# Patient Record
Sex: FEMALE | Race: WHITE
[De-identification: names, ages, dates, MRNs, and addresses within clinical notes are randomized per-mention and may not be internally consistent; named-entity substitution may affect disease eponyms.]

---

## 2017-05-19 ENCOUNTER — RX ONLY (RX ONLY)
Age: 72
End: 2017-05-19

## 2017-05-19 ENCOUNTER — DOCTOR'S OFFICE (OUTPATIENT)
Dept: URBAN - METROPOLITAN AREA CLINIC 136 | Facility: CLINIC | Age: 72
Setting detail: OPHTHALMOLOGY
End: 2017-05-19
Payer: COMMERCIAL

## 2017-05-19 DIAGNOSIS — H35.033: ICD-10-CM

## 2017-05-19 DIAGNOSIS — H04.123: ICD-10-CM

## 2017-05-19 DIAGNOSIS — H35.3121: ICD-10-CM

## 2017-05-19 DIAGNOSIS — H52.223: ICD-10-CM

## 2017-05-19 DIAGNOSIS — H25.13: ICD-10-CM

## 2017-05-19 DIAGNOSIS — H52.03: ICD-10-CM

## 2017-05-19 DIAGNOSIS — H52.4: ICD-10-CM

## 2017-05-19 PROCEDURE — 92015 DETERMINE REFRACTIVE STATE: CPT | Performed by: OPTOMETRIST

## 2017-05-19 PROCEDURE — 92014 COMPRE OPH EXAM EST PT 1/>: CPT | Performed by: OPTOMETRIST

## 2017-05-19 ASSESSMENT — REFRACTION_AUTOREFRACTION
OD_CYLINDER: -2.25
OD_SPHERE: +3.50
OS_CYLINDER: -1.00
OS_SPHERE: +2.25
OS_AXIS: 112
OD_AXIS: 102

## 2017-05-19 ASSESSMENT — SUPERFICIAL PUNCTATE KERATITIS (SPK)
OS_SPK: ABSENT
OD_SPK: 1+

## 2017-05-19 ASSESSMENT — REFRACTION_CURRENTRX
OS_OVR_VA: 20/
OS_OVR_VA: 20/
OD_AXIS: 105
OD_OVR_VA: 20/
OD_OVR_VA: 20/
OS_ADD: +2.75
OD_OVR_VA: 20/
OD_CYLINDER: -0.50
OS_AXIS: 107
OD_ADD: +2.75
OS_CYLINDER: -0.75
OD_VPRISM_DIRECTION: PROGS
OS_OVR_VA: 20/
OS_VPRISM_DIRECTION: PROGS
OD_SPHERE: +1.25
OS_SPHERE: +2.50

## 2017-05-19 ASSESSMENT — REFRACTION_MANIFEST
OS_VA1: 20/
OD_VA1: 20/
OD_VA3: 20/
OU_VA: 20/
OS_VA3: 20/
OU_VA: 20/
OD_VA2: 20/
OD_VA1: 20/
OD_VA2: 20/
OS_VA1: 20/
OS_VA2: 20/
OD_VA3: 20/
OS_VA2: 20/
OS_VA3: 20/

## 2017-05-19 ASSESSMENT — REFRACTION_OUTSIDERX
OD_SPHERE: +3.00
OS_VA3: 20/
OD_AXIS: 100
OS_CYLINDER: -1.00
OS_AXIS: 105
OD_ADD: +2.75
OS_VA2: 20/20
OU_VA: 20/20
OD_CYLINDER: -1.00
OS_ADD: +2.75
OS_VA1: 20/20
OD_VA1: 20/20
OS_SPHERE: +3.25
OD_VA3: 20/
OD_VA2: 20/20

## 2017-05-19 ASSESSMENT — KERATOMETRY
OS_K2POWER_DIOPTERS: 46.25
OS_K1POWER_DIOPTERS: 46.00
OD_K1POWER_DIOPTERS: 45.25
OD_AXISANGLE_DEGREES: 110
OD_K2POWER_DIOPTERS: 46.00
OS_AXISANGLE_DEGREES: 152

## 2017-05-19 ASSESSMENT — LID EXAM ASSESSMENTS
OS_MEIBOMITIS: +2
OD_MEIBOMITIS: +2

## 2017-05-19 ASSESSMENT — VISUAL ACUITY
OD_BCVA: 20/25-2
OS_BCVA: 20/40+2

## 2017-05-19 ASSESSMENT — TEAR BREAK UP TIME (TBUT)
OS_TBUT: 2+
OD_TBUT: 2+

## 2017-05-19 ASSESSMENT — SPHEQUIV_DERIVED
OS_SPHEQUIV: 1.75
OD_SPHEQUIV: 2.375

## 2017-05-19 ASSESSMENT — CONFRONTATIONAL VISUAL FIELD TEST (CVF)
OS_FINDINGS: FULL
OD_FINDINGS: FULL

## 2017-05-19 ASSESSMENT — DECREASING TEAR LAKE - SEVERITY SCORE
OD_DEC_TEARLAKE: 2+
OS_DEC_TEARLAKE: 2+

## 2017-05-19 ASSESSMENT — AXIALLENGTH_DERIVED
OS_AL: 22.0533
OD_AL: 22.0011

## 2017-05-23 ENCOUNTER — OPTICAL OFFICE (OUTPATIENT)
Dept: URBAN - METROPOLITAN AREA CLINIC 143 | Facility: CLINIC | Age: 72
Setting detail: OPHTHALMOLOGY
End: 2017-05-23

## 2017-05-23 DIAGNOSIS — H52.223: ICD-10-CM

## 2017-05-23 PROCEDURE — V2020 VISION SVCS FRAMES PURCHASES: HCPCS | Performed by: OPTOMETRIST

## 2017-05-23 PROCEDURE — V2750 ANTI-REFLECTIVE COATING: HCPCS | Performed by: OPTOMETRIST

## 2017-05-23 PROCEDURE — V2781 PROGRESSIVE LENS PER LENS: HCPCS | Performed by: OPTOMETRIST

## 2017-12-07 ENCOUNTER — DOCTOR'S OFFICE (OUTPATIENT)
Dept: URBAN - METROPOLITAN AREA CLINIC 136 | Facility: CLINIC | Age: 72
Setting detail: OPHTHALMOLOGY
End: 2017-12-07
Payer: COMMERCIAL

## 2017-12-07 DIAGNOSIS — H35.3121: ICD-10-CM

## 2017-12-07 DIAGNOSIS — H25.13: ICD-10-CM

## 2017-12-07 DIAGNOSIS — H35.3131: ICD-10-CM

## 2017-12-07 DIAGNOSIS — H35.3111: ICD-10-CM

## 2017-12-07 DIAGNOSIS — H35.033: ICD-10-CM

## 2017-12-07 DIAGNOSIS — H04.123: ICD-10-CM

## 2017-12-07 PROCEDURE — 92014 COMPRE OPH EXAM EST PT 1/>: CPT | Performed by: OPHTHALMOLOGY

## 2017-12-07 PROCEDURE — 92134 CPTRZ OPH DX IMG PST SGM RTA: CPT | Performed by: OPHTHALMOLOGY

## 2017-12-07 ASSESSMENT — DECREASING TEAR LAKE - SEVERITY SCORE: OD_DEC_TEARLAKE: 2+

## 2017-12-07 ASSESSMENT — LID EXAM ASSESSMENTS
OD_MEIBOMITIS: +2
OS_MEIBOMITIS: +2

## 2017-12-07 ASSESSMENT — SUPERFICIAL PUNCTATE KERATITIS (SPK)
OD_SPK: 1+
OS_SPK: 1+

## 2017-12-07 ASSESSMENT — CONFRONTATIONAL VISUAL FIELD TEST (CVF)
OS_FINDINGS: FULL
OD_FINDINGS: FULL

## 2017-12-11 ASSESSMENT — VISUAL ACUITY
OS_BCVA: 20/25
OD_BCVA: 20/25+2

## 2017-12-11 ASSESSMENT — SPHEQUIV_DERIVED
OS_SPHEQUIV: 1.75
OD_SPHEQUIV: 2.375

## 2017-12-11 ASSESSMENT — REFRACTION_MANIFEST
OS_VA1: 20/
OD_VA3: 20/
OS_VA3: 20/
OD_VA2: 20/
OD_VA1: 20/
OD_VA3: 20/
OS_VA2: 20/
OU_VA: 20/
OS_VA3: 20/
OD_VA1: 20/
OU_VA: 20/
OS_VA2: 20/
OD_VA2: 20/
OS_VA1: 20/

## 2017-12-11 ASSESSMENT — REFRACTION_AUTOREFRACTION
OD_SPHERE: +3.50
OD_CYLINDER: -2.25
OS_SPHERE: +2.25
OS_AXIS: 112
OS_CYLINDER: -1.00
OD_AXIS: 102

## 2017-12-11 ASSESSMENT — REFRACTION_OUTSIDERX
OU_VA: 20/20
OS_VA1: 20/20
OD_VA3: 20/
OD_VA1: 20/20
OS_AXIS: 105
OD_CYLINDER: -1.00
OD_ADD: +2.75
OS_VA2: 20/20
OD_AXIS: 100
OD_VA2: 20/20
OS_SPHERE: +3.25
OS_ADD: +2.75
OS_VA3: 20/
OS_CYLINDER: -1.00
OD_SPHERE: +3.00

## 2017-12-11 ASSESSMENT — REFRACTION_CURRENTRX
OS_ADD: +2.75
OD_SPHERE: +1.25
OD_OVR_VA: 20/
OS_VPRISM_DIRECTION: PROGS
OS_OVR_VA: 20/
OD_ADD: +2.75
OS_OVR_VA: 20/
OD_AXIS: 105
OS_OVR_VA: 20/
OD_VPRISM_DIRECTION: PROGS
OD_CYLINDER: -0.50
OS_AXIS: 107
OS_SPHERE: +2.50
OS_CYLINDER: -0.75

## 2018-05-16 ENCOUNTER — DOCTOR'S OFFICE (OUTPATIENT)
Dept: URBAN - METROPOLITAN AREA CLINIC 136 | Facility: CLINIC | Age: 73
Setting detail: OPHTHALMOLOGY
End: 2018-05-16
Payer: COMMERCIAL

## 2018-05-16 DIAGNOSIS — H35.033: ICD-10-CM

## 2018-05-16 DIAGNOSIS — H35.3131: ICD-10-CM

## 2018-05-16 DIAGNOSIS — H25.13: ICD-10-CM

## 2018-05-16 DIAGNOSIS — H04.123: ICD-10-CM

## 2018-05-16 PROCEDURE — 92134 CPTRZ OPH DX IMG PST SGM RTA: CPT | Performed by: OPTOMETRIST

## 2018-05-16 PROCEDURE — 92014 COMPRE OPH EXAM EST PT 1/>: CPT | Performed by: OPTOMETRIST

## 2018-05-16 ASSESSMENT — REFRACTION_OUTSIDERX
OD_ADD: +2.75
OD_SPHERE: +3.00
OS_VA2: 20/20
OS_VA3: 20/
OD_VA1: 20/20
OS_SPHERE: +3.25
OS_AXIS: 105
OD_VA3: 20/
OS_ADD: +2.75
OS_CYLINDER: -1.00
OD_AXIS: 100
OS_VA1: 20/20
OU_VA: 20/20
OD_CYLINDER: -1.00
OD_VA2: 20/20

## 2018-05-16 ASSESSMENT — DECREASING TEAR LAKE - SEVERITY SCORE
OS_DEC_TEARLAKE: 1+
OD_DEC_TEARLAKE: 1+

## 2018-05-16 ASSESSMENT — REFRACTION_MANIFEST
OD_VA1: 20/
OS_VA1: 20/
OU_VA: 20/
OS_VA3: 20/
OS_VA2: 20/
OS_VA1: 20/
OS_VA2: 20/
OD_VA1: 20/
OD_VA3: 20/
OD_VA2: 20/
OD_VA3: 20/
OD_VA2: 20/
OS_VA3: 20/
OU_VA: 20/

## 2018-05-16 ASSESSMENT — KERATOMETRY
OS_K1POWER_DIOPTERS: 46.00
OD_K1POWER_DIOPTERS: 45.25
OD_AXISANGLE_DEGREES: 110
OD_K2POWER_DIOPTERS: 46.00
OS_K2POWER_DIOPTERS: 46.25
OS_AXISANGLE_DEGREES: 152

## 2018-05-16 ASSESSMENT — CONFRONTATIONAL VISUAL FIELD TEST (CVF)
OD_FINDINGS: FULL
OS_FINDINGS: FULL

## 2018-05-16 ASSESSMENT — REFRACTION_CURRENTRX
OD_CYLINDER: -1.00
OD_SPHERE: +1.25
OD_SPHERE: +3.50
OS_OVR_VA: 20/
OS_AXIS: 107
OS_VPRISM_DIRECTION: PROGS
OD_VPRISM_DIRECTION: PROGS
OS_CYLINDER: -0.75
OD_ADD: +2.75
OS_OVR_VA: 20/
OD_AXIS: 104
OD_OVR_VA: 20/
OD_OVR_VA: 20/
OS_SPHERE: +2.50
OD_ADD: +2.75
OD_CYLINDER: -0.50
OD_VPRISM_DIRECTION: PROGS
OS_CYLINDER: -1.00
OS_ADD: +2.75
OD_AXIS: 105
OS_SPHERE: +3.00
OS_ADD: +2.75
OS_OVR_VA: 20/
OS_AXIS: 099
OS_VPRISM_DIRECTION: PROGS
OD_OVR_VA: 20/

## 2018-05-16 ASSESSMENT — REFRACTION_AUTOREFRACTION
OD_SPHERE: +3.25
OS_AXIS: 107
OD_AXIS: 102
OD_CYLINDER: -1.75
OS_SPHERE: +3.00
OS_CYLINDER: -0.75

## 2018-05-16 ASSESSMENT — LID EXAM ASSESSMENTS
OD_MEIBOMITIS: +2
OS_MEIBOMITIS: +2

## 2018-05-16 ASSESSMENT — SUPERFICIAL PUNCTATE KERATITIS (SPK)
OD_SPK: ABSENT
OS_SPK: ABSENT

## 2018-05-16 ASSESSMENT — SPHEQUIV_DERIVED
OS_SPHEQUIV: 2.625
OD_SPHEQUIV: 2.375

## 2018-05-16 ASSESSMENT — VISUAL ACUITY
OS_BCVA: 20/20
OD_BCVA: 20/25-1

## 2018-05-16 ASSESSMENT — AXIALLENGTH_DERIVED
OS_AL: 21.7593
OD_AL: 22.0011

## 2018-11-19 ENCOUNTER — DOCTOR'S OFFICE (OUTPATIENT)
Dept: URBAN - METROPOLITAN AREA CLINIC 136 | Facility: CLINIC | Age: 73
Setting detail: OPHTHALMOLOGY
End: 2018-11-19
Payer: COMMERCIAL

## 2018-11-19 DIAGNOSIS — H35.033: ICD-10-CM

## 2018-11-19 DIAGNOSIS — H35.3131: ICD-10-CM

## 2018-11-19 DIAGNOSIS — H04.123: ICD-10-CM

## 2018-11-19 DIAGNOSIS — H25.13: ICD-10-CM

## 2018-11-19 PROCEDURE — 92134 CPTRZ OPH DX IMG PST SGM RTA: CPT | Performed by: OPTOMETRIST

## 2018-11-19 PROCEDURE — 92014 COMPRE OPH EXAM EST PT 1/>: CPT | Performed by: OPTOMETRIST

## 2018-11-19 ASSESSMENT — REFRACTION_CURRENTRX
OS_ADD: +2.75
OD_CYLINDER: -1.00
OD_OVR_VA: 20/
OD_VPRISM_DIRECTION: PROGS
OS_OVR_VA: 20/
OS_AXIS: 099
OS_SPHERE: +3.00
OD_VPRISM_DIRECTION: PROGS
OD_SPHERE: +3.50
OD_ADD: +2.75
OS_SPHERE: +3.25
OS_OVR_VA: 20/
OS_CYLINDER: -1.00
OS_VPRISM_DIRECTION: PROGS
OD_ADD: +2.75
OS_ADD: +2.75
OS_AXIS: 103
OS_CYLINDER: -1.00
OD_CYLINDER: -1.00
OD_AXIS: 104
OD_SPHERE: +3.00
OD_AXIS: 101
OS_OVR_VA: 20/
OD_OVR_VA: 20/
OD_OVR_VA: 20/
OS_VPRISM_DIRECTION: PROGS

## 2018-11-19 ASSESSMENT — REFRACTION_AUTOREFRACTION
OS_SPHERE: +3.00
OS_CYLINDER: -0.50
OD_AXIS: 108
OD_CYLINDER: -1.00
OD_SPHERE: +3.50
OS_AXIS: 116

## 2018-11-19 ASSESSMENT — REFRACTION_MANIFEST
OU_VA: 20/20
OS_VA2: 20/
OS_VA1: 20/
OS_VA1: 20/20
OD_VA3: 20/
OS_VA2: 20/20
OD_SPHERE: +3.00
OD_ADD: +2.75
OS_VA3: 20/
OD_VA2: 20/
OS_AXIS: 105
OD_VA3: 20/
OS_VA3: 20/
OD_VA1: 20/
OS_SPHERE: +3.25
OD_VA2: 20/20
OS_ADD: +2.75
OD_CYLINDER: -1.00
OD_AXIS: 100
OD_VA1: 20/20
OS_CYLINDER: -1.00
OU_VA: 20/

## 2018-11-19 ASSESSMENT — VISUAL ACUITY
OD_BCVA: 20/25+2
OS_BCVA: 20/25-2

## 2018-11-19 ASSESSMENT — SPHEQUIV_DERIVED
OS_SPHEQUIV: 2.75
OD_SPHEQUIV: 2.5
OS_SPHEQUIV: 2.75
OD_SPHEQUIV: 3

## 2018-11-19 ASSESSMENT — DECREASING TEAR LAKE - SEVERITY SCORE
OS_DEC_TEARLAKE: 1+
OD_DEC_TEARLAKE: 1+

## 2018-11-19 ASSESSMENT — KERATOMETRY
OD_AXISANGLE_DEGREES: 110
OS_AXISANGLE_DEGREES: 152
OD_K2POWER_DIOPTERS: 46.00
OS_K2POWER_DIOPTERS: 46.25
OS_K1POWER_DIOPTERS: 46.00
OD_K1POWER_DIOPTERS: 45.25

## 2018-11-19 ASSESSMENT — AXIALLENGTH_DERIVED
OD_AL: 21.7913
OS_AL: 21.718
OD_AL: 21.9588
OS_AL: 21.718

## 2018-11-19 ASSESSMENT — SUPERFICIAL PUNCTATE KERATITIS (SPK)
OD_SPK: ABSENT
OS_SPK: ABSENT

## 2018-11-19 ASSESSMENT — CONFRONTATIONAL VISUAL FIELD TEST (CVF)
OS_FINDINGS: FULL
OD_FINDINGS: FULL

## 2018-11-19 ASSESSMENT — LID EXAM ASSESSMENTS
OS_MEIBOMITIS: +2
OD_MEIBOMITIS: +2

## 2019-05-22 ENCOUNTER — DOCTOR'S OFFICE (OUTPATIENT)
Dept: URBAN - METROPOLITAN AREA CLINIC 136 | Facility: CLINIC | Age: 74
Setting detail: OPHTHALMOLOGY
End: 2019-05-22
Payer: COMMERCIAL

## 2019-05-22 DIAGNOSIS — H52.223: ICD-10-CM

## 2019-05-22 DIAGNOSIS — H25.13: ICD-10-CM

## 2019-05-22 DIAGNOSIS — H52.03: ICD-10-CM

## 2019-05-22 DIAGNOSIS — H52.4: ICD-10-CM

## 2019-05-22 DIAGNOSIS — H04.123: ICD-10-CM

## 2019-05-22 DIAGNOSIS — H35.3131: ICD-10-CM

## 2019-05-22 DIAGNOSIS — H35.033: ICD-10-CM

## 2019-05-22 PROCEDURE — 92015 DETERMINE REFRACTIVE STATE: CPT | Performed by: OPTOMETRIST

## 2019-05-22 PROCEDURE — 92134 CPTRZ OPH DX IMG PST SGM RTA: CPT | Performed by: OPTOMETRIST

## 2019-05-22 PROCEDURE — 92014 COMPRE OPH EXAM EST PT 1/>: CPT | Performed by: OPTOMETRIST

## 2019-05-22 ASSESSMENT — REFRACTION_AUTOREFRACTION
OD_CYLINDER: -1.25
OS_CYLINDER: -0.25
OS_AXIS: 159
OD_AXIS: 106
OS_SPHERE: +2.50
OD_SPHERE: +3.75

## 2019-05-22 ASSESSMENT — REFRACTION_CURRENTRX
OS_OVR_VA: 20/
OS_ADD: +2.75
OS_VPRISM_DIRECTION: PROGS
OD_SPHERE: +3.50
OS_CYLINDER: -1.00
OS_AXIS: 099
OD_CYLINDER: -0.75
OD_SPHERE: +3.00
OD_OVR_VA: 20/
OS_SPHERE: +3.25
OS_OVR_VA: 20/
OS_ADD: +2.75
OS_VPRISM_DIRECTION: PROGS
OD_OVR_VA: 20/
OD_AXIS: 096
OS_SPHERE: +3.00
OS_CYLINDER: -1.00
OD_VPRISM_DIRECTION: PROGS
OD_VPRISM_DIRECTION: PROGS
OD_OVR_VA: 20/
OS_AXIS: 100
OD_ADD: +2.75
OS_OVR_VA: 20/
OD_ADD: +2.75
OD_AXIS: 104
OD_CYLINDER: -1.00

## 2019-05-22 ASSESSMENT — LID EXAM ASSESSMENTS
OS_MEIBOMITIS: +2
OD_MEIBOMITIS: +2

## 2019-05-22 ASSESSMENT — SPHEQUIV_DERIVED
OD_SPHEQUIV: 2.625
OS_SPHEQUIV: 2.75
OS_SPHEQUIV: 2.375
OD_SPHEQUIV: 3.125

## 2019-05-22 ASSESSMENT — KERATOMETRY
OS_K1POWER_DIOPTERS: 46.00
OS_K2POWER_DIOPTERS: 46.25
OS_AXISANGLE_DEGREES: 152
OD_K2POWER_DIOPTERS: 46.00
OD_K1POWER_DIOPTERS: 45.25
OD_AXISANGLE_DEGREES: 110

## 2019-05-22 ASSESSMENT — REFRACTION_MANIFEST
OS_ADD: +2.75
OS_VA2: 20/
OD_VA2: 20/20
OS_AXIS: 100
OD_VA3: 20/
OD_CYLINDER: -1.25
OD_SPHERE: +3.25
OS_SPHERE: +3.25
OS_VA1: 20/
OD_VA1: 20/
OD_VA3: 20/
OS_VA2: 20/20
OS_CYLINDER: -1.00
OD_VA1: 20/20
OD_VA2: 20/
OD_ADD: +2.75
OS_VA3: 20/
OS_VA1: 20/20
OS_VA3: 20/
OD_AXIS: 105
OU_VA: 20/
OU_VA: 20/20

## 2019-05-22 ASSESSMENT — VISUAL ACUITY
OD_BCVA: 20/20
OS_BCVA: 20/20

## 2019-05-22 ASSESSMENT — SUPERFICIAL PUNCTATE KERATITIS (SPK)
OS_SPK: ABSENT
OD_SPK: ABSENT

## 2019-05-22 ASSESSMENT — DECREASING TEAR LAKE - SEVERITY SCORE
OD_DEC_TEARLAKE: 1+
OS_DEC_TEARLAKE: 1+

## 2019-05-22 ASSESSMENT — AXIALLENGTH_DERIVED
OS_AL: 21.718
OD_AL: 21.7499
OD_AL: 21.9167
OS_AL: 21.8425

## 2019-05-22 ASSESSMENT — CONFRONTATIONAL VISUAL FIELD TEST (CVF)
OD_FINDINGS: FULL
OS_FINDINGS: FULL

## 2019-11-20 ENCOUNTER — DOCTOR'S OFFICE (OUTPATIENT)
Dept: URBAN - METROPOLITAN AREA CLINIC 136 | Facility: CLINIC | Age: 74
Setting detail: OPHTHALMOLOGY
End: 2019-11-20
Payer: COMMERCIAL

## 2019-11-20 DIAGNOSIS — H35.033: ICD-10-CM

## 2019-11-20 DIAGNOSIS — H04.123: ICD-10-CM

## 2019-11-20 DIAGNOSIS — H25.13: ICD-10-CM

## 2019-11-20 DIAGNOSIS — H35.3131: ICD-10-CM

## 2019-11-20 PROCEDURE — 92134 CPTRZ OPH DX IMG PST SGM RTA: CPT | Performed by: OPTOMETRIST

## 2019-11-20 PROCEDURE — 92014 COMPRE OPH EXAM EST PT 1/>: CPT | Performed by: OPTOMETRIST

## 2019-11-20 ASSESSMENT — REFRACTION_CURRENTRX
OS_AXIS: 100
OD_SPHERE: +3.50
OS_OVR_VA: 20/
OS_CYLINDER: -1.00
OS_VPRISM_DIRECTION: PROGS
OD_AXIS: 096
OS_VPRISM_DIRECTION: PROGS
OS_SPHERE: +3.25
OS_CYLINDER: -1.00
OD_OVR_VA: 20/
OD_AXIS: 104
OD_ADD: +2.75
OS_ADD: +2.75
OD_VPRISM_DIRECTION: PROGS
OS_SPHERE: +3.00
OS_OVR_VA: 20/
OS_ADD: +2.75
OD_OVR_VA: 20/
OD_ADD: +2.75
OS_OVR_VA: 20/
OD_SPHERE: +3.00
OD_CYLINDER: -0.75
OD_VPRISM_DIRECTION: PROGS
OD_CYLINDER: -1.00
OD_OVR_VA: 20/
OS_AXIS: 099

## 2019-11-20 ASSESSMENT — SUPERFICIAL PUNCTATE KERATITIS (SPK)
OD_SPK: ABSENT
OS_SPK: ABSENT

## 2019-11-20 ASSESSMENT — DECREASING TEAR LAKE - SEVERITY SCORE
OD_DEC_TEARLAKE: 1+
OS_DEC_TEARLAKE: 1+

## 2019-11-20 ASSESSMENT — REFRACTION_MANIFEST
OS_CYLINDER: -1.00
OU_VA: 20/20
OD_VA3: 20/
OS_VA2: 20/
OD_VA1: 20/20
OD_ADD: +2.75
OD_SPHERE: +3.25
OS_ADD: +2.75
OD_VA2: 20/20
OD_VA3: 20/
OS_VA2: 20/20
OS_AXIS: 100
OD_VA1: 20/
OS_VA3: 20/
OS_VA3: 20/
OD_AXIS: 105
OU_VA: 20/
OS_VA1: 20/20
OS_VA1: 20/
OS_SPHERE: +3.25
OD_VA2: 20/
OD_CYLINDER: -1.25

## 2019-11-20 ASSESSMENT — REFRACTION_AUTOREFRACTION
OS_CYLINDER: -0.25
OD_SPHERE: +3.75
OD_AXIS: 106
OS_SPHERE: +2.50
OS_AXIS: 159
OD_CYLINDER: -1.25

## 2019-11-20 ASSESSMENT — CONFRONTATIONAL VISUAL FIELD TEST (CVF)
OS_FINDINGS: FULL
OD_FINDINGS: FULL

## 2019-11-20 ASSESSMENT — LID EXAM ASSESSMENTS
OD_MEIBOMITIS: +2
OS_MEIBOMITIS: +2

## 2019-11-20 ASSESSMENT — SPHEQUIV_DERIVED
OD_SPHEQUIV: 3.125
OS_SPHEQUIV: 2.375
OS_SPHEQUIV: 2.75
OD_SPHEQUIV: 2.625

## 2019-11-20 ASSESSMENT — VISUAL ACUITY
OD_BCVA: 20/25-1
OS_BCVA: 20/25-2

## 2020-05-21 ENCOUNTER — DOCTOR'S OFFICE (OUTPATIENT)
Dept: URBAN - METROPOLITAN AREA CLINIC 136 | Facility: CLINIC | Age: 75
Setting detail: OPHTHALMOLOGY
End: 2020-05-21
Payer: COMMERCIAL

## 2020-05-21 DIAGNOSIS — H35.033: ICD-10-CM

## 2020-05-21 DIAGNOSIS — H35.3131: ICD-10-CM

## 2020-05-21 DIAGNOSIS — H04.123: ICD-10-CM

## 2020-05-21 DIAGNOSIS — H25.13: ICD-10-CM

## 2020-05-21 PROCEDURE — 92134 CPTRZ OPH DX IMG PST SGM RTA: CPT | Performed by: OPTOMETRIST

## 2020-05-21 PROCEDURE — 92014 COMPRE OPH EXAM EST PT 1/>: CPT | Performed by: OPTOMETRIST

## 2020-05-21 ASSESSMENT — AXIALLENGTH_DERIVED
OD_AL: 21.7499
OS_AL: 21.718
OS_AL: 21.8425
OD_AL: 21.9167

## 2020-05-21 ASSESSMENT — SPHEQUIV_DERIVED
OS_SPHEQUIV: 2.75
OD_SPHEQUIV: 2.625
OD_SPHEQUIV: 3.125
OS_SPHEQUIV: 2.375

## 2020-05-21 ASSESSMENT — REFRACTION_MANIFEST
OD_CYLINDER: -1.25
OS_AXIS: 100
OS_SPHERE: +3.25
OD_SPHERE: +3.25
OD_VA1: 20/20
OD_ADD: +2.75
OD_AXIS: 105
OS_CYLINDER: -1.00
OU_VA: 20/20
OD_VA2: 20/20
OS_ADD: +2.75
OS_VA1: 20/20
OS_VA2: 20/20

## 2020-05-21 ASSESSMENT — REFRACTION_CURRENTRX
OS_AXIS: 099
OD_SPHERE: +3.50
OS_SPHERE: +3.25
OS_AXIS: 100
OD_CYLINDER: -1.00
OD_SPHERE: +3.00
OD_CYLINDER: -0.75
OD_AXIS: 096
OS_ADD: +2.75
OS_CYLINDER: -1.00
OD_ADD: +2.75
OS_CYLINDER: -1.00
OS_SPHERE: +3.00
OS_VPRISM_DIRECTION: PROGS
OD_AXIS: 104
OD_ADD: +2.75
OS_VPRISM_DIRECTION: PROGS
OS_OVR_VA: 20/
OD_VPRISM_DIRECTION: PROGS
OD_OVR_VA: 20/
OS_OVR_VA: 20/
OD_OVR_VA: 20/
OD_VPRISM_DIRECTION: PROGS
OS_ADD: +2.75

## 2020-05-21 ASSESSMENT — LID EXAM ASSESSMENTS
OS_MEIBOMITIS: +2
OD_MEIBOMITIS: +2

## 2020-05-21 ASSESSMENT — DECREASING TEAR LAKE - SEVERITY SCORE
OD_DEC_TEARLAKE: 1+
OS_DEC_TEARLAKE: 1+

## 2020-05-21 ASSESSMENT — KERATOMETRY
OD_AXISANGLE_DEGREES: 110
OD_K1POWER_DIOPTERS: 45.25
OS_K2POWER_DIOPTERS: 46.25
OS_AXISANGLE_DEGREES: 152
OS_K1POWER_DIOPTERS: 46.00
OD_K2POWER_DIOPTERS: 46.00

## 2020-05-21 ASSESSMENT — REFRACTION_AUTOREFRACTION
OS_SPHERE: +2.50
OD_AXIS: 106
OS_AXIS: 159
OD_CYLINDER: -1.25
OD_SPHERE: +3.75
OS_CYLINDER: -0.25

## 2020-05-21 ASSESSMENT — SUPERFICIAL PUNCTATE KERATITIS (SPK)
OS_SPK: ABSENT
OD_SPK: ABSENT

## 2020-05-21 ASSESSMENT — CONFRONTATIONAL VISUAL FIELD TEST (CVF)
OS_FINDINGS: FULL
OD_FINDINGS: FULL

## 2020-05-21 ASSESSMENT — VISUAL ACUITY
OS_BCVA: 20/25+2
OD_BCVA: 20/25

## 2020-11-24 ENCOUNTER — DOCTOR'S OFFICE (OUTPATIENT)
Dept: URBAN - METROPOLITAN AREA CLINIC 136 | Facility: CLINIC | Age: 75
Setting detail: OPHTHALMOLOGY
End: 2020-11-24
Payer: COMMERCIAL

## 2020-11-24 DIAGNOSIS — H35.033: ICD-10-CM

## 2020-11-24 DIAGNOSIS — H35.3131: ICD-10-CM

## 2020-11-24 DIAGNOSIS — H04.123: ICD-10-CM

## 2020-11-24 DIAGNOSIS — H25.13: ICD-10-CM

## 2020-11-24 PROBLEM — H52.4 HYPEROPIA WITH ASTIGMATISM AND PRESBYOPIA OU ; BOTH EYES: Status: ACTIVE | Noted: 2017-05-19

## 2020-11-24 PROBLEM — H52.03 HYPEROPIA WITH ASTIGMATISM AND PRESBYOPIA OU ; BOTH EYES: Status: ACTIVE | Noted: 2017-05-19

## 2020-11-24 PROBLEM — H52.223 HYPEROPIA WITH ASTIGMATISM AND PRESBYOPIA OU ; BOTH EYES: Status: ACTIVE | Noted: 2017-05-19

## 2020-11-24 PROCEDURE — 92134 CPTRZ OPH DX IMG PST SGM RTA: CPT | Performed by: OPTOMETRIST

## 2020-11-24 PROCEDURE — 92014 COMPRE OPH EXAM EST PT 1/>: CPT | Performed by: OPTOMETRIST

## 2020-11-24 ASSESSMENT — SUPERFICIAL PUNCTATE KERATITIS (SPK)
OS_SPK: ABSENT
OD_SPK: ABSENT

## 2020-11-24 ASSESSMENT — LID EXAM ASSESSMENTS
OS_MEIBOMITIS: +2
OD_MEIBOMITIS: +2

## 2020-11-24 ASSESSMENT — REFRACTION_CURRENTRX
OS_OVR_VA: 20/
OD_SPHERE: +3.50
OD_ADD: +2.75
OS_VPRISM_DIRECTION: PROGS
OS_CYLINDER: -1.00
OS_OVR_VA: 20/
OD_OVR_VA: 20/
OS_AXIS: 100
OS_VPRISM_DIRECTION: PROGS
OS_SPHERE: +3.00
OD_CYLINDER: -1.25
OD_VPRISM_DIRECTION: PROGS
OD_SPHERE: +3.25
OD_OVR_VA: 20/
OD_AXIS: 104
OS_CYLINDER: -1.00
OS_SPHERE: +3.25
OD_CYLINDER: -1.00
OS_ADD: +2.75
OD_AXIS: 105
OS_ADD: +2.75
OD_VPRISM_DIRECTION: PROGS
OD_ADD: +2.75
OS_AXIS: 099

## 2020-11-24 ASSESSMENT — REFRACTION_AUTOREFRACTION
OS_AXIS: 111
OS_SPHERE: +3.50
OS_CYLINDER: -1.25
OD_CYLINDER: -2.00
OD_AXIS: 109
OD_SPHERE: +4.25

## 2020-11-24 ASSESSMENT — CONFRONTATIONAL VISUAL FIELD TEST (CVF)
OD_FINDINGS: FULL
OS_FINDINGS: FULL

## 2020-11-24 ASSESSMENT — REFRACTION_MANIFEST
OS_CYLINDER: -1.00
OD_VA1: 20/20
OD_AXIS: 105
OD_ADD: +2.75
OD_SPHERE: +3.25
OS_AXIS: 100
OS_SPHERE: +3.25
OU_VA: 20/20
OS_VA1: 20/20
OS_VA2: 20/20
OD_CYLINDER: -1.25
OD_VA2: 20/20
OS_ADD: +2.75

## 2020-11-24 ASSESSMENT — AXIALLENGTH_DERIVED
OD_AL: 21.9167
OD_AL: 21.7086
OS_AL: 21.718
OS_AL: 21.6768

## 2020-11-24 ASSESSMENT — SPHEQUIV_DERIVED
OS_SPHEQUIV: 2.875
OD_SPHEQUIV: 3.25
OD_SPHEQUIV: 2.625
OS_SPHEQUIV: 2.75

## 2020-11-24 ASSESSMENT — KERATOMETRY
OS_K2POWER_DIOPTERS: 46.25
OS_K1POWER_DIOPTERS: 46.00
OS_AXISANGLE_DEGREES: 152
OD_K1POWER_DIOPTERS: 45.25
OD_AXISANGLE_DEGREES: 110
OD_K2POWER_DIOPTERS: 46.00

## 2020-11-24 ASSESSMENT — TONOMETRY
OS_IOP_MMHG: 13
OD_IOP_MMHG: 13

## 2020-11-24 ASSESSMENT — VISUAL ACUITY
OD_BCVA: 20/30+2
OS_BCVA: 20/25+2

## 2020-11-24 ASSESSMENT — DECREASING TEAR LAKE - SEVERITY SCORE
OS_DEC_TEARLAKE: 1+
OD_DEC_TEARLAKE: 1+

## 2021-07-26 NOTE — PROGRESS NOTES
PT Evaluation     Today's date: 2021  Patient name: Sky Medina  : 1945  MRN: [id number]  Referring provider: Gideon Homans, PA-C  Dx:   Encounter Diagnosis     ICD-10-CM    1  Pelvic floor weakness in female  N81 89    2  Stress incontinence  N39 3                   Assessment  Assessment details: Sky Medina is a 68 y o  female who presents with concerns of pelvic floor weakness, urinary incontinence and gas  Patient presents with the below outlined deficits and is appropriate for skilled physical therapy in order to address deficits and ultimately meet goal of independent self management of condition  Therapeutic activities performed upon examination included education regarding pelvic floor anatomy, explanation of exam technique, explanation of exam findings and discussion of treatment plan as well as expectations of the patient to emphasize the importance of compliance and adherence to physical therapy visits  Impairments: abnormal muscle firing, abnormal muscle tone, activity intolerance and lacks appropriate home exercise program    Goals  STGs to be met in 4 weeks:  * Patient will be compliant with introductory HEP as prescribed  * Patient presents with good understanding of pelvic floor protective strategies to reduce intra-abdominal pressure against pelvic organs  * Patient will demonstrate and report good postural techniques with toileting  LTGs to be met by discharge:  * Patient will present with a  60% improvement on FOTO score by discharge to indicate improved symptom management  * Normalize sEMG findings to indicate strength average > 12uV and resting average < 2 0uV  * Demonstrates correct isolation and relaxation of pelvic floor to palpation without overflow from global stabilizers  * Patient will use pelvic floor muscles correctly during functional ADLs such as coughing, sneezing, lifting and exercise activities to avoid excessive IAP and PFM strain     * Patient will be compliant with comprehensive home exercise program for self management of condition  Plan  Patient would benefit from: skilled physical therapy  Referral necessary: No  Planned modality interventions: biofeedback  Planned therapy interventions: manual therapy, motor coordination training, patient education, therapeutic activities, therapeutic exercise, strengthening and home exercise program  Frequency: 1x week  Duration in weeks: 12  Plan of Care beginning date: 7/27/2021  Plan of Care expiration date: 10/19/2021        PT Pelvic Floor Subjective:   History of Present Illness:   From [facility] chart review:  Patient presents for a follow-up visit for OAB/vaginal atrophy  Currently prescribed Trospium 20mg BID and estrogen cream   S/p oxybutynin, vesicare (blurry vision)    Today the patient reports:   Hasn't really noticed significant improvement with the trospium  Patient reports with possible side effects she would like to stop the medication at this time  She is interested in a repeat evaluation of physical therapy, she is interested in seeing a different therapist to see if there is anything else she can be doing       --------------------------------------------------------------------------------------------------------------  January 2020 - cystocele, rectocele and complete hysterectomy performed due to organ prolapse  She reports that shortly after surgery she had very little control of her bladder    This has improved but is still a concern "and the reason I'm here "     Patient reports that she has gas and "with gas I feel like there are some spasms that make my bladder contract and feel like I need to empty it "          Recurrent probem    Quality of life: good    Social Support:     Lives with:  Alone (spouse lives in [facility] - can visit one time a week)    Work status: retired  Hand dominance:  Right  Diet and Exercise:      Exercise type: walking    Likes to walk 7 days a week - averages 4-5 miles     OB/ gyn History    Gestational History:     Prior Pregnancy: Yes      Number of prior pregnancies: 2    Delivery Type: vaginal delivery      Delivery Complications:  Born in [birth year] and 1976     Menstrual History:      Menopausal: menopause (onset around age 23-25 years ago)  H/o premarin cream and estrace - 3 times a week currently   Bladder Function:     Voiding Difficulties positive for: hesitancy (occ) and straining (occ - 5-10%)      Voiding Difficulties comments:     Voiding frequency: every 1-2 hours and every 3-4 hours    Urinary leakage: urine leakage    Urinary leakage aggravated by: walking to the bathroom, hearing running water and seeing a toilet    Nocturia (episodes per night): 1 and 2    Painful urination: No      Intake (ounces): Water intake (oz): 40-52 ounces  Coffee intake (oz): 20 ounces  Intake (ounces) comment: Avoids sitting on the toilet in public    Likes to end the evening with milk, seltzer, or wine    Incontinence Management:     Pads/Diapers Additional Comments: always bladder control pads #2 and 3     Bowel Function:     Bowel frequency: daily and multiple times a day    [place] Stool Scale: type 4 and type 3  Sexual Function:     Sexually Active:  Non-contributory  Pain:     No pain reported by patient  Diagnostic Tests:     None    Treatments:     None    Patient Goals:     Patient goals for therapy:  Improved quality of life, improved comfort, improved bladder or bowel function and fully empty bladder or bowels      Objective   Pelvic Floor Exam     General Perineum Exam:   Positive for perianal erythema       General perineum exam comments: No discharge, irritation, organ prolapse or skin breakdown evident      Visual Inspection of Perineum:   Excursion of perineal body in cephalad direction with contraction of pelvic floor muscles (PFM): fair   Excursion of perineal body in caudal direction with relaxation of pelvic floor muscles (PFM): fair Involuntary contraction with coughing: yes  Involuntary relaxation with bearing down: no  Cotton swab test: non-tender  Cough reflex: cough reflex  Sphincter Tone Resting: weak  Sphincter Tone Squeeze: weak  Sensation: intact    Pelvic Organ Prolapse   Position: hook-lying  At rest: mild and mild  With bearing down: moderate (to the level of hymenal remnants) and severe (1 cm beyond the level of hymenal remnants)  Location: posterior  Comments: Standing pelvic floor assessment to be completed next session to assess organ prolapse with gravity - patient provided verbal and written consent    Pelvic Floor Muscle Exam     Palpation   No increased muscle tension in the obturator internus  Minimal increased muscle tension in the periurethral  Minimal tenderness on right in the obturator internus and periurethral  Minimal tenderness on left in the obturator internus and periurethral    Breathing pattern with contraction: holding breath  Pelvic floor muscle relaxation is complete       PERFECT Score   Power right: 2/5  Power left: 2/5  Endurance (seconds to max): 4  Repetitions (before fatigue): 4    SMEG Biofeedback   to be assessed next treatment               Precautions: standard      Manuals 7/27            standing POP assessment nv                                                   Neuro Re-Ed                                                                                                        Ther Ex                                                                                                                     Ther Activity             education anatomy and POC x10'                         Gait Training                                       Modalities             biofeedback nv

## 2021-07-27 ENCOUNTER — EVALUATION (OUTPATIENT)
Dept: PHYSICAL THERAPY | Facility: REHABILITATION | Age: 76
End: 2021-07-27
Payer: MEDICARE

## 2021-07-27 DIAGNOSIS — N81.89 PELVIC FLOOR WEAKNESS IN FEMALE: Primary | ICD-10-CM

## 2021-07-27 DIAGNOSIS — N39.3 STRESS INCONTINENCE: ICD-10-CM

## 2021-07-27 PROCEDURE — 97162 PT EVAL MOD COMPLEX 30 MIN: CPT | Performed by: PHYSICAL THERAPIST

## 2021-07-27 PROCEDURE — 97530 THERAPEUTIC ACTIVITIES: CPT | Performed by: PHYSICAL THERAPIST

## 2021-07-27 NOTE — LETTER
2021    Roc Kimbrough PA-C  [address]    Patient: Jeannine Goodson   YOB: 1945   Date of Visit: 2021     Encounter Diagnosis     ICD-10-CM    1  Pelvic floor weakness in female  N81 89    2  Stress incontinence  N39 3        Dear Dr Sonya Becerra: Thank you for your recent referral of Jeannine Goodson  Please review the attached evaluation summary from [address] [place] [address] recent visit  Please verify that you agree with the plan of care by signing the attached order  If you have any questions or concerns, please do not hesitate to call  I sincerely appreciate the opportunity to share in the care of one of your patients and hope to have another opportunity to work with you in the near future  Sincerely,    Mila Jorgensen, PT      Referring Provider:      I certify that I have read the below Plan of Care and certify the need for these services furnished under this plan of treatment while under my care  Roc Kimbrough PA-C  [address]  Via Fax: [phone]          PT Evaluation     Today's date: 2021  Patient name: Jeannine Goodson  : 1945  MRN: [id number]  Referring provider: Roc Kimbrough PA-C  Dx:   Encounter Diagnosis     ICD-10-CM    1  Pelvic floor weakness in female  N81 89    2  Stress incontinence  N39 3                   Assessment  Assessment details: Jeannine Goodson is a 68 y o  female who presents with concerns of pelvic floor weakness, urinary incontinence and gas  Patient presents with the below outlined deficits and is appropriate for skilled physical therapy in order to address deficits and ultimately meet goal of independent self management of condition       Therapeutic activities performed upon examination included education regarding pelvic floor anatomy, explanation of exam technique, explanation of exam findings and discussion of treatment plan as well as expectations of the patient to emphasize the importance of compliance and adherence to physical therapy visits  Impairments: abnormal muscle firing, abnormal muscle tone, activity intolerance and lacks appropriate home exercise program    Goals  STGs to be met in 4 weeks:  * Patient will be compliant with introductory HEP as prescribed  * Patient presents with good understanding of pelvic floor protective strategies to reduce intra-abdominal pressure against pelvic organs  * Patient will demonstrate and report good postural techniques with toileting  LTGs to be met by discharge:  * Patient will present with a  60% improvement on FOTO score by discharge to indicate improved symptom management  * Normalize sEMG findings to indicate strength average > 12uV and resting average < 2 0uV  * Demonstrates correct isolation and relaxation of pelvic floor to palpation without overflow from global stabilizers  * Patient will use pelvic floor muscles correctly during functional ADLs such as coughing, sneezing, lifting and exercise activities to avoid excessive IAP and PFM strain  * Patient will be compliant with comprehensive home exercise program for self management of condition  Plan  Patient would benefit from: skilled physical therapy  Referral necessary: No  Planned modality interventions: biofeedback  Planned therapy interventions: manual therapy, motor coordination training, patient education, therapeutic activities, therapeutic exercise, strengthening and home exercise program  Frequency: 1x week  Duration in weeks: 12  Plan of Care beginning date: 7/27/2021  Plan of Care expiration date: 10/19/2021        PT Pelvic Floor Subjective:   History of Present Illness:   From [facility] chart review:  Patient presents for a follow-up visit for OAB/vaginal atrophy      Currently prescribed Trospium 20mg BID and estrogen cream   S/p oxybutynin, vesicare (blurry vision)    Today the patient reports:   Hasn't really noticed significant improvement with the trospium  Patient reports with possible side effects she would like to stop the medication at this time  She is interested in a repeat evaluation of physical therapy, she is interested in seeing a different therapist to see if there is anything else she can be doing       --------------------------------------------------------------------------------------------------------------  January 2020 - cystocele, rectocele and complete hysterectomy performed due to organ prolapse  She reports that shortly after surgery she had very little control of her bladder  This has improved but is still a concern "and the reason I'm here "     Patient reports that she has gas and "with gas I feel like there are some spasms that make my bladder contract and feel like I need to empty it "          Recurrent probem    Quality of life: good    Social Support:     Lives with:  Alone (spouse lives in [facility] - can visit one time a week)    Work status: retired  Hand dominance:  Right  Diet and Exercise:      Exercise type: walking    Likes to walk 7 days a week - averages 4-5 miles     OB/ gyn History    Gestational History:     Prior Pregnancy: Yes      Number of prior pregnancies: 2    Delivery Type: vaginal delivery      Delivery Complications:  Born in [birth year] and 1976     Menstrual History:      Menopausal: menopause (onset around age 23-25 years ago)  H/o premarin cream and estrace - 3 times a week currently   Bladder Function:     Voiding Difficulties positive for: hesitancy (occ) and straining (occ - 5-10%)      Voiding Difficulties comments:     Voiding frequency: every 1-2 hours and every 3-4 hours    Urinary leakage: urine leakage    Urinary leakage aggravated by: walking to the bathroom, hearing running water and seeing a toilet    Nocturia (episodes per night): 1 and 2    Painful urination: No      Intake (ounces): Water intake (oz): 40-52 ounces  Coffee intake (oz): 20 ounces  Intake (ounces) comment: Avoids sitting on the toilet in public    Likes to end the evening with milk, seltzer, or wine    Incontinence Management:     Pads/Diapers Additional Comments: always bladder control pads #2 and 3     Bowel Function:     Bowel frequency: daily and multiple times a day    [place] Stool Scale: type 4 and type 3  Sexual Function:     Sexually Active:  Non-contributory  Pain:     No pain reported by patient  Diagnostic Tests:     None    Treatments:     None    Patient Goals:     Patient goals for therapy:  Improved quality of life, improved comfort, improved bladder or bowel function and fully empty bladder or bowels      Objective   Pelvic Floor Exam     General Perineum Exam:   Positive for perianal erythema       General perineum exam comments: No discharge, irritation, organ prolapse or skin breakdown evident      Visual Inspection of Perineum:   Excursion of perineal body in cephalad direction with contraction of pelvic floor muscles (PFM): fair   Excursion of perineal body in caudal direction with relaxation of pelvic floor muscles (PFM): fair   Involuntary contraction with coughing: yes  Involuntary relaxation with bearing down: no  Cotton swab test: non-tender  Cough reflex: cough reflex  Sphincter Tone Resting: weak  Sphincter Tone Squeeze: weak  Sensation: intact    Pelvic Organ Prolapse   Position: hook-lying  At rest: mild and mild  With bearing down: moderate (to the level of hymenal remnants) and severe (1 cm beyond the level of hymenal remnants)  Location: posterior  Comments: Standing pelvic floor assessment to be completed next session to assess organ prolapse with gravity - patient provided verbal and written consent    Pelvic Floor Muscle Exam     Palpation   No increased muscle tension in the obturator internus  Minimal increased muscle tension in the periurethral  Minimal tenderness on right in the obturator internus and periurethral  Minimal tenderness on left in the obturator internus and periurethral    Breathing pattern with contraction: holding breath  Pelvic floor muscle relaxation is complete       PERFECT Score   Power right: 2/5  Power left: 2/5  Endurance (seconds to max): 4  Repetitions (before fatigue): 4    SMEG Biofeedback   to be assessed next treatment               Precautions: standard      Manuals 7/27            standing POP assessment nv                                                   Neuro Re-Ed                                                                                                        Ther Ex                                                                                                                     Ther Activity             education anatomy and POC x10'                         Gait Training                                       Modalities             biofeedback nv

## 2021-08-02 NOTE — PROGRESS NOTES
Daily Note     Today's date: 8/3/2021  Patient name: Scott Siddiqui  : 1945  MRN: [id number]  Referring provider: No ref  provider found  Dx:   Encounter Diagnosis     ICD-10-CM    1  Stress incontinence  N39 3    2  Pelvic floor weakness in female  N81 89        From evalation: Patient presents for a follow-up visit for OAB/vaginal atrophy      Currently prescribed Trospium 20mg BID and estrogen cream   S/p oxybutynin, vesicare (blurry vision)     Today the patient reports:   Hasn't really noticed significant improvement with the trospium  Patient reports with possible side effects she would like to stop the medication at this time  She is interested in a repeat evaluation of physical therapy, she is interested in seeing a different therapist to see if there is anything else she can be doing        --------------------------------------------------------------------------------------------------------------  2020 - cystocele, rectocele and complete hysterectomy performed due to organ prolapse  She reports that shortly after surgery she had very little control of her bladder  This has improved but is still a concern "and the reason I'm here "      Patient reports that she has gas and "with gas I feel like there are some spasms that make my bladder contract and feel like I need to empty it "        Start Time: 1105          Subjective: Patient offers no new complaints since [facility]  Objective: See treatment diary below    Standing PFM assessment performed this session: mod central POP which reduces when patient contracts PFM  She denies pain  PERF in standin/5, , 4, 8    Access Code: [email or web address]  URL: [email or web address]  Date: 2021  Prepared by: Cole Ochoa    Exercises  Supine Pelvic Floor Contraction - 3 x daily - 7 x weekly      Assessment: Tolerated treatment well  Patient would benefit from continued PT    Focused on breathwork this session and explained core canister to Pt with cough and congestion x 2 days. Pt reports no relief with OTC meds.  Pt with nasal congestion with green mucus.   patient and the importance of engaging her deep core muscles prior to global muscles  This made sense to her  She was challenged to coordinate initially but with both verbal and manual cuing, her coordination improved  Issued HEP as above  Plan: Continue per plan of care        Precautions: standard      Manuals 7/27 8/3           standing POP assessment nv 10'                                                  Neuro Re-Ed             DB  15'           DB + PFM + TA on wedge  15' (with manual cuing)                                                                            Ther Ex             TM  3 0 mphx8'                                                                                                      Ther Activity             education anatomy and POC x10' Core canister analogy x10'                        Gait Training                                       Modalities             biofeedback nv

## 2021-08-03 ENCOUNTER — OFFICE VISIT (OUTPATIENT)
Dept: PHYSICAL THERAPY | Facility: REHABILITATION | Age: 76
End: 2021-08-03
Payer: MEDICARE

## 2021-08-03 DIAGNOSIS — N39.3 STRESS INCONTINENCE: Primary | ICD-10-CM

## 2021-08-03 DIAGNOSIS — N81.89 PELVIC FLOOR WEAKNESS IN FEMALE: ICD-10-CM

## 2021-08-03 PROCEDURE — 97530 THERAPEUTIC ACTIVITIES: CPT | Performed by: PHYSICAL THERAPIST

## 2021-08-03 PROCEDURE — 97112 NEUROMUSCULAR REEDUCATION: CPT | Performed by: PHYSICAL THERAPIST

## 2021-08-03 PROCEDURE — 97140 MANUAL THERAPY 1/> REGIONS: CPT | Performed by: PHYSICAL THERAPIST

## 2021-08-16 NOTE — PROGRESS NOTES
Daily Note     Today's date: 2021  Patient name: Bert Villanueva  : 1945  MRN: [id number]  Referring provider: No ref  provider found  Dx:   Encounter Diagnosis     ICD-10-CM    1  Stress incontinence  N39 3    2  Pelvic floor weakness in female  N81 89        From evalation: Patient presents for a follow-up visit for OAB/vaginal atrophy      Currently prescribed Trospium 20mg BID and estrogen cream   S/p oxybutynin, vesicare (blurry vision)     Today the patient reports:   Hasn't really noticed significant improvement with the trospium  Patient reports with possible side effects she would like to stop the medication at this time  She is interested in a repeat evaluation of physical therapy, she is interested in seeing a different therapist to see if there is anything else she can be doing        --------------------------------------------------------------------------------------------------------------  2020 - cystocele, rectocele and complete hysterectomy performed due to organ prolapse  She reports that shortly after surgery she had very little control of her bladder  This has improved but is still a concern "and the reason I'm here "      Patient reports that she has gas and "with gas I feel like there are some spasms that make my bladder contract and feel like I need to empty it "                   Subjective: Patient reports that she is having trouble discerning if she is doing her exercises correctly  She did not attempt them sitting these past 2 weeks for this reason but was compliant with supine exercises  Objective: See treatment diary below    8/3/21: Standing PFM assessment performed this session: mod central POP which reduces when patient contracts PFM  She denies pain  PERF in standin/5, 4, 4, 8        Assessment: Tolerated treatment well  Patient would benefit from continued PT   Furthered manual cuing and she appears to have less sensory awareness on R side   Increased endurance time to 6 seconds with breath, added quick flicks for urge suppression and added bridges  Plan: Continue per plan of care   NV - further LE/UE movements     Precautions: standard      Manuals 7/27 8/3 8/17          standing POP assessment nv 10'           mapping   15'          Cuing for PFM contract/relax   15'                       Neuro Re-Ed             DB  15'           DB + PFM + TA on wedge  15' (with manual cuing)                                                                            Ther Ex             TM  3 0 mphx8' 3 0 mph x8'          PFM 6 seconds with breath   Cued and issued as HEP          Quick flicks   Issued as HEP for urge suppression          PFM + bridge   x10                                                               Ther Activity             education anatomy and POC x10' Core canister analogy x10'                        Gait Training                                       Modalities             biofeedback nv

## 2021-08-17 ENCOUNTER — OFFICE VISIT (OUTPATIENT)
Dept: PHYSICAL THERAPY | Facility: REHABILITATION | Age: 76
End: 2021-08-17
Payer: MEDICARE

## 2021-08-17 DIAGNOSIS — N81.89 PELVIC FLOOR WEAKNESS IN FEMALE: ICD-10-CM

## 2021-08-17 DIAGNOSIS — N39.3 STRESS INCONTINENCE: Primary | ICD-10-CM

## 2021-08-17 PROCEDURE — 97110 THERAPEUTIC EXERCISES: CPT | Performed by: PHYSICAL THERAPIST

## 2021-08-17 PROCEDURE — 97140 MANUAL THERAPY 1/> REGIONS: CPT | Performed by: PHYSICAL THERAPIST

## 2021-08-23 NOTE — PROGRESS NOTES
Daily Note     Today's date: 2021  Patient name: Janan Collet  : 1945  MRN: [id number]  Referring provider: No ref  provider found  Dx:   Encounter Diagnosis     ICD-10-CM    1  Stress incontinence  N39 3    2  Pelvic floor weakness in female  N81 89        From evalation: Patient presents for a follow-up visit for OAB/vaginal atrophy      Currently prescribed Trospium 20mg BID and estrogen cream   S/p oxybutynin, vesicare (blurry vision)     Today the patient reports:   Hasn't really noticed significant improvement with the trospium  Patient reports with possible side effects she would like to stop the medication at this time  She is interested in a repeat evaluation of physical therapy, she is interested in seeing a different therapist to see if there is anything else she can be doing        --------------------------------------------------------------------------------------------------------------  2020 - cystocele, rectocele and complete hysterectomy performed due to organ prolapse  She reports that shortly after surgery she had very little control of her bladder  This has improved but is still a concern "and the reason I'm here "      Patient reports that she has gas and "with gas I feel like there are some spasms that make my bladder contract and feel like I need to empty it "                   Subjective: Patient reports that she stopped wearing her bladder control pad x 5 days  and she reports "it is much better " She only had a few dribbles past week  She has been compliant with HEP (6 sec holds with breath f/b 10 quick holds, + 10 bridges with PFM - all 3 times a day)  Objective: See treatment diary below    Access Code: [facility]  URL: [email or web address]  Date: 2021  Prepared by: Adam Campbell    Exercises  Supine Hip Adduction Isometric with Lane Kales - 1 x daily - 7 x weekly  Hooklying Clamshell with Resistance - 1 x daily - 7 x weekly        Assessment: Tolerated treatment well  Patient would benefit from continued PT   Added co-contractions with hip adduction and abduction in supine  Patient has been deliberate with her focus and control so we have been adding exercises slowly and incrementally  She did well today although needed a little cuing to engage PFM prior to moving her hips  She would be appropriate to add sitting nv and consider PFM manual check in a few sessions  Plan: Continue per plan of care   NV - further LE/UE movements     Precautions: standard      Manuals 7/27 8/3 8/17 8/24         standing POP assessment nv 10'           mapping   15'          Cuing for PFM contract/relax   15'          Hip stretches    10'         Neuro Re-Ed             DB  15'  10'         DB + PFM + TA on wedge  15' (with manual cuing)                                                                            Ther Ex             Nustep, seat 8    L4x8'         TM  3 0 mphx8' 3 0 mph x8'          PFM 6 seconds with breath   Cued and issued as HEP R         Quick flicks   Issued as HEP for urge suppression R         PFM + bridge   x10  x10         PFM + adduction    hookling x3'         PFM + abduction    hooklying x3'         physioball rajeev     *        Sit to stand     *        Ther Activity             education anatomy and POC x10' Core canister analogy x10'                        Gait Training                                       Modalities             biofeedback nv                            R = reviewed

## 2021-08-24 ENCOUNTER — OFFICE VISIT (OUTPATIENT)
Dept: PHYSICAL THERAPY | Facility: REHABILITATION | Age: 76
End: 2021-08-24
Payer: MEDICARE

## 2021-08-24 DIAGNOSIS — N39.3 STRESS INCONTINENCE: Primary | ICD-10-CM

## 2021-08-24 DIAGNOSIS — N81.89 PELVIC FLOOR WEAKNESS IN FEMALE: ICD-10-CM

## 2021-08-24 PROCEDURE — 97140 MANUAL THERAPY 1/> REGIONS: CPT | Performed by: PHYSICAL THERAPIST

## 2021-08-24 PROCEDURE — 97110 THERAPEUTIC EXERCISES: CPT | Performed by: PHYSICAL THERAPIST

## 2021-08-24 PROCEDURE — 97112 NEUROMUSCULAR REEDUCATION: CPT | Performed by: PHYSICAL THERAPIST

## 2021-08-30 ENCOUNTER — OFFICE VISIT (OUTPATIENT)
Dept: PHYSICAL THERAPY | Facility: REHABILITATION | Age: 76
End: 2021-08-30
Payer: MEDICARE

## 2021-08-30 DIAGNOSIS — N39.3 STRESS INCONTINENCE: Primary | ICD-10-CM

## 2021-08-30 DIAGNOSIS — N81.89 PELVIC FLOOR WEAKNESS IN FEMALE: ICD-10-CM

## 2021-08-30 PROCEDURE — 97140 MANUAL THERAPY 1/> REGIONS: CPT

## 2021-08-30 PROCEDURE — 97110 THERAPEUTIC EXERCISES: CPT

## 2021-08-30 NOTE — PROGRESS NOTES
Daily Note     Today's date: 2021  Patient name: Sorin Aparicio  : 1945  MRN: [id number]  Referring provider: No ref  provider found  Dx:   Encounter Diagnosis     ICD-10-CM    1  Stress incontinence  N39 3    2  Pelvic floor weakness in female  N81 89        From evalation: Patient presents for a follow-up visit for OAB/vaginal atrophy      Currently prescribed Trospium 20mg BID and estrogen cream   S/p oxybutynin, vesicare (blurry vision)     Today the patient reports:   Hasn't really noticed significant improvement with the trospium  Patient reports with possible side effects she would like to stop the medication at this time  She is interested in a repeat evaluation of physical therapy, she is interested in seeing a different therapist to see if there is anything else she can be doing        --------------------------------------------------------------------------------------------------------------  2020 - cystocele, rectocele and complete hysterectomy performed due to organ prolapse  She reports that shortly after surgery she had very little control of her bladder  This has improved but is still a concern "and the reason I'm here "      Patient reports that she has gas and "with gas I feel like there are some spasms that make my bladder contract and feel like I need to empty it "        Start Time: 805  Stop Time: 900  Total time in clinic (min): 55 minutes    Subjective: Pt reports she is still not using pads and only experiences dribbles at times  Objective: See treatment diary below   Access Code: [email or web address]  URL: [email or web address]  Date: 2021  Prepared by: Maria Guadalupe Salcedo    Program Notes  With the first 2 exercises, please take your breath in, as you pull back the bands exhale and engage your pelvic floor muscles & lower abdominals        Exercises  Standing Bilateral Low Shoulder Row with Anchored Resistance - 1 x daily - 7 x weekly - 3 sets - 10 reps  Shoulder Extension with Resistance - 1 x daily - 7 x weekly - 3 sets - 10 reps  Sit to Stand with Pelvic Floor Contraction - 1 x daily - 7 x weekly - 3 sets - 10 reps       Assessment: Tolerated treatment well  Patient would benefit from continued PT   Good tolerance to progression of exercises  Requires cueing to coordinate co-contractions and exhale with exertion  Plan: Continue per plan of care  NV- Consider PFM check in the next few sessions       Precautions: standard  HEP: (08/24/21) hip add, clamshells    Manuals 7/27 8/3 8/17 8/24 8/30        standing POP assessment nv 10'           mapping   15'          Cuing for PFM contract/relax   15'  nv        Hip stretches    10' 15'        Neuro Re-Ed             DB  15'  10'         DB + PFM + TA on wedge  15' (with manual cuing)                                                                            Ther Ex             Nustep, seat 8    L4x8' L4 8'        TM  3 0 mphx8' 3 0 mph x8'          PFM 6 seconds with breath   Cued and issued as HEP R         Quick flicks   Issued as HEP for urge suppression R         PFM + bridge   x10  x10 x10 w/ YCO        PFM + adduction    hookling x3'         PFM + abduction    hooklying x3'         physioball marches     2x10        Pball Alt UE + march     2x10        Pball LAQ             Shoulder rows w/ DB/PFM/TA     yellow 2x10        Shoulder ext w/ DB/PFM/TA     yellow 2x10        Sit to stand     10x        Ther Activity             education anatomy and POC x10' Core canister analogy x10'                        Gait Training                                       Modalities             biofeedback nv                            R = reviewed

## 2021-09-13 ENCOUNTER — OFFICE VISIT (OUTPATIENT)
Dept: PHYSICAL THERAPY | Facility: REHABILITATION | Age: 76
End: 2021-09-13
Payer: MEDICARE

## 2021-09-13 DIAGNOSIS — N81.89 PELVIC FLOOR WEAKNESS IN FEMALE: ICD-10-CM

## 2021-09-13 DIAGNOSIS — N39.3 STRESS INCONTINENCE: Primary | ICD-10-CM

## 2021-09-13 PROCEDURE — 97110 THERAPEUTIC EXERCISES: CPT | Performed by: PHYSICAL THERAPIST

## 2021-09-13 PROCEDURE — 97140 MANUAL THERAPY 1/> REGIONS: CPT | Performed by: PHYSICAL THERAPIST

## 2021-09-13 NOTE — PROGRESS NOTES
Daily Note     Today's date: 2021  Patient name: Corbin Hutchison  : 1945  MRN: [id number]  Referring provider: Nyasia Myrick DO  Dx:   Encounter Diagnosis     ICD-10-CM    1  Stress incontinence  N39 3    2  Pelvic floor weakness in female  N81 89        From evalation: Patient presents for a follow-up visit for OAB/vaginal atrophy      Currently prescribed Trospium 20mg BID and estrogen cream   S/p oxybutynin, vesicare (blurry vision)     Today the patient reports:   Hasn't really noticed significant improvement with the trospium  Patient reports with possible side effects she would like to stop the medication at this time  She is interested in a repeat evaluation of physical therapy, she is interested in seeing a different therapist to see if there is anything else she can be doing        --------------------------------------------------------------------------------------------------------------  2020 - cystocele, rectocele and complete hysterectomy performed due to organ prolapse  She reports that shortly after surgery she had very little control of her bladder  This has improved but is still a concern "and the reason I'm here "      Patient reports that she has gas and "with gas I feel like there are some spasms that make my bladder contract and feel like I need to empty it "                   Subjective: Pt reports she was gardening over the weekend and had some leakage episodes with that but otherwise is feeling ok  Objective: See treatment diary below     Access Code: [email or web address]  URL: [email or web address]  Date: 2021  Prepared by: Sheri Sharma    Exercises  Quadruped Pelvic Floor Contraction with Opposite Arm and Leg Lift - 3 x daily - 7 x weekly  Mini Squat with Pelvic Floor Contraction - 3 x daily - 7 x weekly    Assessment: Tolerated treatment well  Patient would benefit from continued PT   Good tolerance to progression of exercises   Improved understanding of exhale and kegel together  Given her challenge with symptoms when working in the garden, we added quadruped and squat positions in office and to her HEP today  Plan: Continue per plan of care           Precautions: standard  HEP: (08/24/21) hip add, clamshells    Manuals 7/27 8/3 8/17 8/24 8/30 9/13       standing POP assessment nv 10'           mapping   15'   15'       Cuing for PFM contract/relax   15'  nv 15'       Hip stretches    10' 15'        Neuro Re-Ed             DB  15'  10'         DB + PFM + TA on wedge  15' (with manual cuing)                                                                            Ther Ex             Nustep, seat 8    L4x8' L4 8' L4 x10'       TM  3 0 mphx8' 3 0 mph x8'          PFM 6 seconds with breath   Cued and issued as HEP R         Quick flicks   Issued as HEP for urge suppression R         PFM + bridge   x10  x10 x10 w/ YCO        PFM + adduction    hookling x3'         PFM + abduction    hooklying x3'         physioball marches     2x10        Pball Alt UE + march     2x10        Pball LAQ             Shoulder rows w/ DB/PFM/TA     yellow 2x10        Shoulder ext w/ DB/PFM/TA     yellow 2x10        Sit to stand     10x x10 on towel roll       Quad shld ext w/ PFM      x10 each       Squats (PFM on up phase)      x10       Reformer: supine      (1R) Slides  x10; marches x10       Reformer: nick      nv *                   Ther Activity             education anatomy and POC x10' Core canister analogy x10'                        Gait Training                                       Modalities             biofeedback nv                            R = reviewed

## 2021-09-14 ENCOUNTER — APPOINTMENT (OUTPATIENT)
Dept: PHYSICAL THERAPY | Facility: REHABILITATION | Age: 76
End: 2021-09-14
Payer: MEDICARE

## 2021-09-20 NOTE — PROGRESS NOTES
Daily Note     Today's date: 2021  Patient name: Janet Washington  : 1945  MRN: [id number]  Referring provider: No ref  provider found  Dx:   Encounter Diagnosis     ICD-10-CM    1  Stress incontinence  N39 3    2  Pelvic floor weakness in female  N81 89        From evalation: Patient presents for a follow-up visit for OAB/vaginal atrophy      Currently prescribed Trospium 20mg BID and estrogen cream   S/p oxybutynin, vesicare (blurry vision)     Today the patient reports:   Hasn't really noticed significant improvement with the trospium  Patient reports with possible side effects she would like to stop the medication at this time  She is interested in a repeat evaluation of physical therapy, she is interested in seeing a different therapist to see if there is anything else she can be doing        --------------------------------------------------------------------------------------------------------------  2020 - cystocele, rectocele and complete hysterectomy performed due to organ prolapse  She reports that shortly after surgery she had very little control of her bladder  This has improved but is still a concern "and the reason I'm here "      Patient reports that she has gas and "with gas I feel like there are some spasms that make my bladder contract and feel like I need to empty it "        Start Time: 1015  Stop Time: 1100  Total time in clinic (min): 45 minutes    Subjective: Pt reports experiencing leakage when lifting a 30 pound bag out of her trunk, pt denies engaging her PFM and lower abs  Objective: See treatment diary below    Surface EMG biofeedback was used to augment strength and coordination and was performed in both hooklying and sitting position  Patient presents with 2uV resting activity level at baseline   Set#1: (hooklying) 5 sec active PFM contractions followed by 10 sec of rest for 10 repetitions   Average muscle activity during work phase measured 9 8uV, with the goal being > 12 0uV; average muscle activity during rest phase measured 2 1 uV with the goal being < 2 5uV   Set#2: (hooklying) Performed 2 sec active PFM contractions followed by 4 sec of rest for 10 repetitions  Average muscle activity during work phase measured 5 9uV, with the goal being > 12 0uV; average muscle activity during rest phase measured 3 0uV with the goal being < 2 5uV   Set#3: (sitting) Performed 5 sec active PFM contractions followed by 10 sec of rest for 10 repetitions  Average muscle activity during work phase measured 8 7uV, with the goal being > 12 0uV; average muscle activity during rest phase measured 1  8uV with the goal being < 2 5uV   Set#4: (sitting) Performed 10 sec active PFM contractions followed by 10 sec of rest for 5 repetitions  Average muscle activity during work phase measured 7 5uV, with the goal being > 12 0uV; average muscle activity during rest phase measured 1  3uV with the goal being < 2 5uV  Danielle Lightning Assessment: Tolerated treatment well  Patient would benefit from continued PT   EMG training shows good strength, coordination, good resting tone  Challenged with strength and endurance  Pt encouraged to engage her PFM when lifting something,  gardening with squatting, racking and other activities  Plan: Continue per plan of care           Precautions: standard  HEP: (08/24/21) hip add, clamshells    Manuals 7/27 8/3 8/17 8/24 8/30 9/13 9/21      standing POP assessment nv 10'           mapping   15'   15'       Cuing for PFM contract/relax   15'  nv 15'       Hip stretches    10' 15'        Neuro Re-Ed             DB  15'  10'         DB + PFM + TA on wedge  15' (with manual cuing)           EMG training       35'                                                          Ther Ex             Nustep, seat 8    L4x8' L4 8' L4 x10' L4 10'      TM  3 0 mphx8' 3 0 mph x8'          PFM 6 seconds with breath   Cued and issued as HEP R         Quick flicks   Issued as HEP for urge suppression R         PFM + bridge   x10  x10 x10 w/ YCO        PFM + adduction    hookling x3'         PFM + abduction    hooklying x3'         physioball marches     2x10        Pball Alt UE + march     2x10        Pball LAQ             Shoulder rows w/ DB/PFM/TA     yellow 2x10        Shoulder ext w/ DB/PFM/TA     yellow 2x10        Sit to stand     10x x10 on towel roll       Quad shld ext w/ PFM      x10 each       Squats (PFM on up phase)      x10       Reformer: supine      (1R) Slides  x10; marches x10       Reformer: nick      nv *                   Ther Activity             education anatomy and POC x10' Core canister analogy x10'                        Gait Training                                       Modalities             biofeedback nv                            R = reviewed

## 2021-09-21 ENCOUNTER — OFFICE VISIT (OUTPATIENT)
Dept: PHYSICAL THERAPY | Facility: REHABILITATION | Age: 76
End: 2021-09-21
Payer: MEDICARE

## 2021-09-21 DIAGNOSIS — N39.3 STRESS INCONTINENCE: Primary | ICD-10-CM

## 2021-09-21 DIAGNOSIS — N81.89 PELVIC FLOOR WEAKNESS IN FEMALE: ICD-10-CM

## 2021-09-21 PROCEDURE — 97112 NEUROMUSCULAR REEDUCATION: CPT

## 2021-09-21 PROCEDURE — 97110 THERAPEUTIC EXERCISES: CPT

## 2021-10-04 ENCOUNTER — APPOINTMENT (OUTPATIENT)
Dept: PHYSICAL THERAPY | Facility: REHABILITATION | Age: 76
End: 2021-10-04
Payer: MEDICARE

## 2021-10-14 ENCOUNTER — APPOINTMENT (OUTPATIENT)
Dept: PHYSICAL THERAPY | Facility: REHABILITATION | Age: 76
End: 2021-10-14
Payer: MEDICARE

## 2021-10-19 ENCOUNTER — EVALUATION (OUTPATIENT)
Dept: PHYSICAL THERAPY | Facility: REHABILITATION | Age: 76
End: 2021-10-19
Payer: MEDICARE

## 2021-10-19 DIAGNOSIS — N39.3 STRESS INCONTINENCE: Primary | ICD-10-CM

## 2021-10-19 DIAGNOSIS — N81.89 PELVIC FLOOR WEAKNESS IN FEMALE: ICD-10-CM

## 2021-10-19 PROCEDURE — 97140 MANUAL THERAPY 1/> REGIONS: CPT | Performed by: PHYSICAL THERAPIST

## 2021-10-19 PROCEDURE — 97110 THERAPEUTIC EXERCISES: CPT | Performed by: PHYSICAL THERAPIST

## 2021-10-26 ENCOUNTER — OFFICE VISIT (OUTPATIENT)
Dept: PHYSICAL THERAPY | Facility: REHABILITATION | Age: 76
End: 2021-10-26
Payer: MEDICARE

## 2021-10-26 DIAGNOSIS — N81.89 PELVIC FLOOR WEAKNESS IN FEMALE: ICD-10-CM

## 2021-10-26 DIAGNOSIS — N39.3 STRESS INCONTINENCE: Primary | ICD-10-CM

## 2021-10-26 PROCEDURE — 97110 THERAPEUTIC EXERCISES: CPT

## 2021-11-02 ENCOUNTER — OFFICE VISIT (OUTPATIENT)
Dept: PHYSICAL THERAPY | Facility: REHABILITATION | Age: 76
End: 2021-11-02
Payer: MEDICARE

## 2021-11-02 DIAGNOSIS — N81.89 PELVIC FLOOR WEAKNESS IN FEMALE: ICD-10-CM

## 2021-11-02 DIAGNOSIS — N39.3 STRESS INCONTINENCE: Primary | ICD-10-CM

## 2021-11-02 PROCEDURE — 97110 THERAPEUTIC EXERCISES: CPT

## 2021-12-06 ENCOUNTER — OFFICE VISIT (OUTPATIENT)
Dept: PHYSICAL THERAPY | Facility: REHABILITATION | Age: 76
End: 2021-12-06
Payer: MEDICARE

## 2021-12-06 DIAGNOSIS — N39.3 STRESS INCONTINENCE: Primary | ICD-10-CM

## 2021-12-06 DIAGNOSIS — N81.89 PELVIC FLOOR WEAKNESS IN FEMALE: ICD-10-CM

## 2021-12-06 PROCEDURE — 97110 THERAPEUTIC EXERCISES: CPT | Performed by: PHYSICAL THERAPIST

## 2021-12-06 PROCEDURE — 97530 THERAPEUTIC ACTIVITIES: CPT | Performed by: PHYSICAL THERAPIST

## 2022-06-20 ENCOUNTER — EVALUATION (OUTPATIENT)
Dept: PHYSICAL THERAPY | Facility: REHABILITATION | Age: 77
End: 2022-06-20
Payer: MEDICARE

## 2022-06-20 DIAGNOSIS — N81.89 PELVIC FLOOR WEAKNESS IN FEMALE: Primary | ICD-10-CM

## 2022-06-20 PROCEDURE — 97530 THERAPEUTIC ACTIVITIES: CPT | Performed by: PHYSICAL THERAPIST

## 2022-06-20 PROCEDURE — 97161 PT EVAL LOW COMPLEX 20 MIN: CPT | Performed by: PHYSICAL THERAPIST

## 2022-06-20 NOTE — PROGRESS NOTES
PT Evaluation  and PT Discharge    Today's date: 2022  Patient name: Nuvia Saldaña  : 1945  MRN: [id number]  Referring provider: Rachna Olivo PA-C  Dx:   Encounter Diagnosis     ICD-10-CM    1  Pelvic floor weakness in female  N81 89                   Assessment  Assessment details: Nuvia Saldaña is a 68 y o  female who presents 6 mos s/p last PFPT visit for "re-check" of her pelvic floor strength and control  We reviewed symptoms, HEP and exam was performed  She presents with marked improvement in strength and holding endurance  She has met all LTGs and was encouraged to continue with her current activity level and suppression techniques  DC at this time  Plan  Planned therapy interventions: motor coordination training  Frequency: 1x week  Duration in visits: 1  Plan of Care beginning date: 2022  Plan of Care expiration date: 2022        PT Pelvic Floor Subjective:   History of Present Illness:   Returns for 6 months f/u  Previous note : Patient reports that she is feeling stronger overall  She feels as though her exercises take about 20-30 minutes  She continues to walk 4*5 miles daily and wears small poise liner due to mild leakage at end of walk sometimes and triggers of running water       From [facility] chart review:  Patient presents for a follow-up visit for OAB/vaginal atrophy  2020 - cystocele, rectocele and complete hysterectomy performed due to organ prolapse  She reports that shortly after surgery she had very little control of her bladder            Recurrent probem    Quality of life: good    Social Support:     Lives with:  Alone (spouse lives in [facility] - can visit one time a week)    Work status: retired  Hand dominance:  Right  Diet and Exercise:      Exercise type: walking    Likes to walk 7 days a week - averages 3-4 miles daily    OB/ gyn History    Gestational History:     Prior Pregnancy: Yes      Number of prior pregnancies: 2 Delivery Type: vaginal delivery      Delivery Complications:  Born in 2546 and 1976     Menstrual History:      Menopausal: menopause (onset around age 23-25 years ago)  H/o premarin cream and estrace - 3 times a week currently   Bladder Function:     Voiding Difficulties negative for: hesitancy (occ) and straining      Voiding Difficulties comments:     Voiding frequency: every 1-2 hours and every 3-4 hours    Urinary leakage: urine leakage    Urinary leakage aggravated by: walking to the bathroom, hearing running water and seeing a toilet    Nocturia (episodes per night): 1 and 2    Painful urination: No      Intake (ounces): Water intake (oz): 40-52 ounces  Coffee intake (oz): 20 ounces  Intake (ounces) comment:     Bowel Function:     Bowel frequency: daily and multiple times a day    [employer] [place] Stool Scale: type 4 and type 3  Sexual Function:     Sexually Active:  Non-contributory  Pain:     No pain reported by patient  Diagnostic Tests:     None    Treatments:     None    Patient Goals:      Other patient goals:  Check on quality of pelvic floor control and strength      Objective   Pelvic Floor Exam     General Perineum Exam:   Negative for perianal erythema    General perineum exam comments: No discharge, irritation, organ prolapse or skin breakdown evident  Atrophy evident along labia minora    Visual Inspection of Perineum:   Excursion of perineal body in cephalad direction with contraction of pelvic floor muscles (PFM): good  Excursion of perineal body in caudal direction with relaxation of pelvic floor muscles (PFM): fair  and good   Involuntary contraction with coughing: yes  Involuntary relaxation with bearing down: yes  Cotton swab test: non-tender  Cough reflex: cough reflex  Sphincter Tone Resting: normal  Sphincter Tone Squeeze: normal and weak  Sensation: intact    Pelvic Organ Prolapse   Position: hook-lying  At rest: mild and mild  With bearing down: moderate (to the level of hymenal remnants)  Location: posterior  Comments: sent    Pelvic Floor Muscle Exam     Palpation   No increased muscle tension in the obturator internus and periurethral  No tenderness on right in the obturator internus and periurethral  No tenderness on left in the obturator internus and periurethral      PERFECT Score   Power right: 4/5  Power left: 4/5  Endurance (seconds to max): 8  Repetitions (before fatigue): 6      Assessment: Tolerated treatment well  Patient is appropriate for discharge to self management  We discussed ongoing maintence exercises           Precautions: standard    Manuals 6/20             standing POP assessment              mapping 5'             Cuing for PFM contract/relax              PFM assessment 25'             Hip stretches              Neuro Re-Ed              DB              DB + PFM + TA on wedge              EMG training                                                                      Ther Ex              Nustep, seat 8              TM              PFM 6 seconds with breath              Quick flicks              PFM + bridge              PFM + adduction R             PFM + abduction R             physioball rajeev              Pball Alt UE + march R             Pball LAQ              Shoulder rows w/ DB/PFM/TA              Shoulder ext w/ DB/PFM/TA              Sit to stand              Quad shld ext w/ PFM              Fire hydrant              Squats (PFM on up phase) R             Reformer: supine              Reformer: wheelbarrows              Bird dogs              Ther Activity              education              Urge deferral handout              Gait Training                                          Modalities              biofeedback                               R = reviewed

## 2024-10-21 DIAGNOSIS — Z00.6 ENCOUNTER FOR EXAMINATION FOR NORMAL COMPARISON OR CONTROL IN CLINICAL RESEARCH PROGRAM: ICD-10-CM

## 2024-10-23 ENCOUNTER — APPOINTMENT (OUTPATIENT)
Dept: LAB | Facility: MEDICAL CENTER | Age: 79
End: 2024-10-23

## 2024-10-23 DIAGNOSIS — Z00.6 ENCOUNTER FOR EXAMINATION FOR NORMAL COMPARISON OR CONTROL IN CLINICAL RESEARCH PROGRAM: ICD-10-CM

## 2024-10-23 PROCEDURE — 36415 COLL VENOUS BLD VENIPUNCTURE: CPT

## 2024-11-04 LAB
APOB+LDLR+PCSK9 GENE MUT ANL BLD/T: NOT DETECTED
BRCA1+BRCA2 DEL+DUP + FULL MUT ANL BLD/T: NOT DETECTED
MLH1+MSH2+MSH6+PMS2 GN DEL+DUP+FUL M: NOT DETECTED